# Patient Record
Sex: FEMALE | Race: WHITE | NOT HISPANIC OR LATINO
[De-identification: names, ages, dates, MRNs, and addresses within clinical notes are randomized per-mention and may not be internally consistent; named-entity substitution may affect disease eponyms.]

---

## 2018-05-07 ENCOUNTER — RESULT REVIEW (OUTPATIENT)
Age: 43
End: 2018-05-07

## 2019-07-19 ENCOUNTER — RESULT REVIEW (OUTPATIENT)
Age: 44
End: 2019-07-19

## 2021-03-24 ENCOUNTER — RESULT REVIEW (OUTPATIENT)
Age: 46
End: 2021-03-24

## 2021-11-09 ENCOUNTER — APPOINTMENT (OUTPATIENT)
Dept: PULMONOLOGY | Facility: CLINIC | Age: 46
End: 2021-11-09

## 2021-11-09 PROBLEM — Z00.00 ENCOUNTER FOR PREVENTIVE HEALTH EXAMINATION: Status: ACTIVE | Noted: 2021-11-09

## 2021-11-10 ENCOUNTER — APPOINTMENT (OUTPATIENT)
Dept: PULMONOLOGY | Facility: CLINIC | Age: 46
End: 2021-11-10
Payer: COMMERCIAL

## 2021-11-10 VITALS
TEMPERATURE: 96.8 F | DIASTOLIC BLOOD PRESSURE: 82 MMHG | WEIGHT: 202 LBS | HEIGHT: 63 IN | OXYGEN SATURATION: 98 % | RESPIRATION RATE: 16 BRPM | SYSTOLIC BLOOD PRESSURE: 122 MMHG | HEART RATE: 77 BPM | BODY MASS INDEX: 35.79 KG/M2

## 2021-11-10 DIAGNOSIS — Z78.9 OTHER SPECIFIED HEALTH STATUS: ICD-10-CM

## 2021-11-10 DIAGNOSIS — I10 ESSENTIAL (PRIMARY) HYPERTENSION: ICD-10-CM

## 2021-11-10 DIAGNOSIS — Z86.79 PERSONAL HISTORY OF OTHER DISEASES OF THE CIRCULATORY SYSTEM: ICD-10-CM

## 2021-11-10 DIAGNOSIS — G43.909 MIGRAINE, UNSPECIFIED, NOT INTRACTABLE, W/OUT STATUS MIGRAINOSUS: ICD-10-CM

## 2021-11-10 DIAGNOSIS — G47.19 OTHER HYPERSOMNIA: ICD-10-CM

## 2021-11-10 DIAGNOSIS — Z86.59 PERSONAL HISTORY OF OTHER MENTAL AND BEHAVIORAL DISORDERS: ICD-10-CM

## 2021-11-10 DIAGNOSIS — R06.83 SNORING: ICD-10-CM

## 2021-11-10 DIAGNOSIS — Z82.0 FAMILY HISTORY OF EPILEPSY AND OTHER DISEASES OF THE NERVOUS SYSTEM: ICD-10-CM

## 2021-11-10 PROCEDURE — 99203 OFFICE O/P NEW LOW 30 MIN: CPT

## 2021-11-10 RX ORDER — HYDROCHLOROTHIAZIDE 25 MG/1
25 TABLET ORAL
Qty: 90 | Refills: 0 | Status: DISCONTINUED | COMMUNITY
Start: 2020-11-19

## 2021-11-10 RX ORDER — VALSARTAN 80 MG/1
80 TABLET, COATED ORAL
Qty: 90 | Refills: 0 | Status: COMPLETED | COMMUNITY
Start: 2020-11-19

## 2021-11-10 RX ORDER — RIZATRIPTAN BENZOATE 10 MG/1
10 TABLET ORAL
Qty: 4 | Refills: 0 | Status: COMPLETED | COMMUNITY
Start: 2020-11-19

## 2021-11-10 RX ORDER — VALSARTAN AND HYDROCHLOROTHIAZIDE 160; 25 MG/1; MG/1
160-25 TABLET, FILM COATED ORAL
Refills: 0 | Status: ACTIVE | COMMUNITY

## 2021-11-10 RX ORDER — TRETINOIN 0.25 MG/G
0.03 CREAM TOPICAL
Qty: 20 | Refills: 0 | Status: ACTIVE | COMMUNITY
Start: 2021-11-04

## 2021-11-10 RX ORDER — AZELASTINE HYDROCHLORIDE 137 UG/1
0.1 SPRAY, METERED NASAL
Qty: 30 | Refills: 0 | Status: DISCONTINUED | COMMUNITY
Start: 2020-11-19

## 2021-11-10 RX ORDER — CHOLECALCIFEROL (VITAMIN D3) 25 MCG
TABLET ORAL
Refills: 0 | Status: ACTIVE | COMMUNITY

## 2021-11-10 RX ORDER — ESCITALOPRAM OXALATE 5 MG/1
5 TABLET, FILM COATED ORAL
Refills: 0 | Status: ACTIVE | COMMUNITY

## 2021-11-10 RX ORDER — MULTIVIT-MIN/FOLIC/VIT K/LYCOP 400-300MCG
1000 TABLET ORAL
Refills: 0 | Status: ACTIVE | COMMUNITY

## 2021-11-10 RX ORDER — VALSARTAN AND HYDROCHLOROTHIAZIDE 160; 25 MG/1; MG/1
160-25 TABLET, FILM COATED ORAL
Qty: 90 | Refills: 0 | Status: COMPLETED | COMMUNITY
Start: 2021-06-23

## 2021-11-10 RX ORDER — FLUTICASONE PROPIONATE 50 UG/1
50 SPRAY, METERED NASAL
Qty: 16 | Refills: 0 | Status: ACTIVE | COMMUNITY
Start: 2020-11-19

## 2021-11-10 RX ORDER — AMLODIPINE BESYLATE 5 MG/1
5 TABLET ORAL
Qty: 90 | Refills: 0 | Status: DISCONTINUED | COMMUNITY
Start: 2021-02-24

## 2021-11-10 NOTE — HISTORY OF PRESENT ILLNESS
[FreeTextEntry1] : Dr. Crouch\par 46 year old woman  with history of htn, anxiety is here in the sleep center to address excessive daytime sleepiness and headaches.  Patient is sleepy with Hewitt sleepiness score of 14.  Patient has very loud snoring, also has witnessed apneas.  Patient's bedtime is around 9 PM wakes up in the morning around 6 AM.  She feels tired when she wakes up.  Patient drinks 1-2 cup of coffee during the daytime. Patient also has headaches, no nocturia.  She is not sleepy while driving.\par STOPBANG score - 3\par

## 2021-11-10 NOTE — ASSESSMENT
[FreeTextEntry1] : Patient has symptoms suggestive of sleep apnea. Will order a sleep study. Discussed about details of the study and possible treatment options.\par Patient is anxious around hospitals, will do a home study.

## 2021-11-10 NOTE — REVIEW OF SYSTEMS
normal... [EDS: ESS=____] : daytime somnolence: ESS=[unfilled] [Snoring] : snoring [Witnessed Apneas] : witnessed apnea [Negative] : Psychiatric

## 2021-11-10 NOTE — PHYSICAL EXAM
[General Appearance - Well Developed] : well developed [General Appearance - Well Nourished] : well nourished [Enlarged Base of the Tongue] : enlargement of the base of the tongue [III] : III [FreeTextEntry1] : crowded oropharynx [Heart Sounds] : normal S1 and S2 [Murmurs] : no murmurs [] : no respiratory distress [Auscultation Breath Sounds / Voice Sounds] : lungs were clear to auscultation bilaterally [No Focal Deficits] : no focal deficits [Oriented To Time, Place, And Person] : oriented to person, place, and time [Memory Recent] : recent memory was not impaired

## 2022-01-06 ENCOUNTER — APPOINTMENT (OUTPATIENT)
Dept: BARIATRICS/WEIGHT MGMT | Facility: CLINIC | Age: 47
End: 2022-01-06
Payer: COMMERCIAL

## 2022-01-06 VITALS — WEIGHT: 200 LBS | BODY MASS INDEX: 35.43 KG/M2

## 2022-01-06 PROCEDURE — 97802 MEDICAL NUTRITION INDIV IN: CPT | Mod: 95

## 2022-01-20 ENCOUNTER — APPOINTMENT (OUTPATIENT)
Dept: BARIATRICS/WEIGHT MGMT | Facility: CLINIC | Age: 47
End: 2022-01-20
Payer: COMMERCIAL

## 2022-01-20 DIAGNOSIS — G47.33 OBSTRUCTIVE SLEEP APNEA (ADULT) (PEDIATRIC): ICD-10-CM

## 2022-01-20 DIAGNOSIS — Z83.3 FAMILY HISTORY OF DIABETES MELLITUS: ICD-10-CM

## 2022-01-20 DIAGNOSIS — Z82.49 FAMILY HISTORY OF ISCHEMIC HEART DISEASE AND OTHER DISEASES OF THE CIRCULATORY SYSTEM: ICD-10-CM

## 2022-01-20 PROCEDURE — 99443: CPT | Mod: 95

## 2022-01-20 NOTE — ASSESSMENT
[FreeTextEntry1] : Recommend dietary modification. Reduce intake of bad fat including-fried foods, full fat dairy products, red meat, meat with skin on it, etc. Recommend eating foods with healthy fat including avocado, fish, omega-3 fatty acids, nuts, lean protein. \par Tracking dietary intake daily to increase your awareness of dietary intake. Continue tracking with [facility]\par Physical Activity- recommend aerobic exercise 3-4 times per week for 30-45 mins, recommend incorporating strength training 3 times per week. Exercise goal- walking daily\par Labs- ordered fasting labs, dex stress test and thyroid studies\par SCHUYLER- continue to use mouthguard, should get better with weight loss\par Medication- pt meets criteria to initiate medication treatment of obesity. Instructed pt to reach out to insurance company to see if saxenda, wegovy, contrave covered. AVOID phentermine or qsymia for this patient +contraindication. \par RTO in 2-4 weeks for lab review & next steps. \par \par

## 2022-01-20 NOTE — HISTORY OF PRESENT ILLNESS
[FreeTextEntry1] : 47 y/o Female referred by CIELO Urena for medical weight loss consultation\par Struggled with gaining 70 lbs over the last 2 years since she lost her daughter to cancer, COVID 19-pandemic, new depression, and ACL tear limiting physical activity\par Medical Hx: HTN, depression, Mod SCHUYLER, migraines\par C/o fatigue, joint pain, regular heavy periods, RA workup negative, plans to f/u with GYN regarding periods\par Mental Health- has a therapist for grief management\par Past Weight Loss Attempts: WW, Noom, tried phentermine x 1 month- had a bad experience on phentermine, increased anxiety, hallucinating while on medication\par Needs to see weight loss in order to feel motivated, would like to know options for medication treatment of obesity \par Food Recall: B- skipped, 2 cups of coffee with oatmilk, L- egg white tortilla with chickpeas, D- steak, black beans, cheese and hot sauce bowl. Dessert Frozen yogurt bar\par Tracking dietary intake and sees RD\par Sleep- improved since she started using mouthguard, Noticed increased energy & dec fatigue\par Stress- High r/t pandemic and grief, sees a therapist, taking lexapro 15 mg daily\par Water Intake- adequate\par Labs done in September 2021- available in [facility]\par \par

## 2022-01-24 RX ORDER — DEXAMETHASONE 1 MG/1
1 TABLET ORAL
Qty: 1 | Refills: 0 | Status: ACTIVE | COMMUNITY
Start: 2022-01-24 | End: 1900-01-01

## 2022-01-25 ENCOUNTER — APPOINTMENT (OUTPATIENT)
Dept: BARIATRICS/WEIGHT MGMT | Facility: CLINIC | Age: 47
End: 2022-01-25

## 2022-02-15 ENCOUNTER — APPOINTMENT (OUTPATIENT)
Dept: BARIATRICS/WEIGHT MGMT | Facility: CLINIC | Age: 47
End: 2022-02-15
Payer: COMMERCIAL

## 2022-02-15 PROCEDURE — 99214 OFFICE O/P EST MOD 30 MIN: CPT | Mod: 95

## 2022-02-15 NOTE — ASSESSMENT
[FreeTextEntry1] : Continue dietary modification and tracking. \par Physical Activity- recommend aerobic exercise 3-4 times per week for 30-45 mins, recommend incorporating strength training 3 times per week. Exercise goal- walking daily\par SCHUYLER- continue to use mouthguard, should get better with weight loss & will help with tension HA\par Medication- pt meets criteria to initiate medication treatment of obesity. Can try low dose Wellbutrin with titration down of lexapro. Pt plans to see PCP before starting this medication regimen. Discussed the risk for lowering seizure threshold, denies hx of seizures. Discussed the risk for serotonin syndrome, made aware of symptoms and recommend she d/c wellbutrin if she feels these symptoms. Sending rx for low dose Wellbutrin and pt to see PCP before starting. \par RTO in 1 month- scheduled f.u on 3/15 at 8:30 am \par \par

## 2022-02-15 NOTE — HISTORY OF PRESENT ILLNESS
[Home] : at home, [unfilled] , at the time of the visit. [Other Location: e.g. Home (Enter Location, City,State)___] : at [unfilled] [Verbal consent obtained from patient] : the patient, [unfilled] [FreeTextEntry1] : 45 y/o Female referred by CIELO Urena for medical weight loss consultation\par Struggled with gaining 70 lbs over the last 2 years since she lost her daughter to cancer, COVID 19-pandemic, new depression, and ACL tear limiting physical activity\par Medical Hx: HTN, depression, Mod SCHUYLER, migraines\par C/o fatigue, joint pain, regular heavy periods, RA workup negative, plans to f/u with GYN regarding periods\par Mental Health- has a therapist for grief management\par Past Weight Loss Attempts: MADDY, Alejandro, tried phentermine x 1 month- had a bad experience on phentermine, increased anxiety, hallucinating while on medication\par Needs to see weight loss in order to feel motivated, would like to know options for medication treatment of obesity \par Food Recall: B- skipped, 2 cups of coffee with oatmilk, L- egg white tortilla with chickpeas, D- steak, black beans, cheese and hot sauce bowl. Dessert Frozen yogurt bar\par Tracking dietary intake and sees RD\par Sleep- improved since she started using mouthguard, Noticed increased energy & dec fatigue\par Stress- High r/t pandemic and grief, sees a therapist, taking lexapro 15 mg daily\par Water Intake- adequate\par Labs done in September 2021- available in [facility]\par \par 2/15/22: Reviewed lab results with pt- CBC, CMP, Thyroid studies, Insulin, and Cortisol levels all WNL. Unable to exercise last week, had a tension headache most of last week,starting to feel better now. Focused on eating well, tracking intake with [facility] gre[facility], making Hello Fresh meals with her son. Has good support with friends. Current insurance does not cover obesity management medication. Would like to try Wellbutrin generic to help with cravings.

## 2022-03-15 ENCOUNTER — APPOINTMENT (OUTPATIENT)
Dept: BARIATRICS/WEIGHT MGMT | Facility: CLINIC | Age: 47
End: 2022-03-15
Payer: COMMERCIAL

## 2022-03-15 PROCEDURE — 99213 OFFICE O/P EST LOW 20 MIN: CPT | Mod: 95

## 2022-03-15 NOTE — ASSESSMENT
[FreeTextEntry1] : Continue dietary modification- may benefit from cutting out white flour products and dairy\par Continue tracking intake with [facility] gre[facility]\par Physical Activity- recommend aerobic exercise 3-4 times per week for 30-45 mins, recommend incorporating strength training 3 times per week.\par Continue exercise routine\par SCHUYLER- continue to use mouthguard, should get better with weight loss\par Medication- sent Rx renewal for Wellbutrin 100 mg daily \par RTO in 1 month- scheduled an in-person session to weight her for 4/28 at 8:30am \par \par

## 2022-03-15 NOTE — HISTORY OF PRESENT ILLNESS
[Home] : at home, [unfilled] , at the time of the visit. [Other Location: e.g. Home (Enter Location, City,State)___] : at [unfilled] [Verbal consent obtained from patient] : the patient, [unfilled] [FreeTextEntry1] : 47 y/o Female referred by CIELO Urena for medical weight loss consultation\par Struggled with gaining 70 lbs over the last 2 years since she lost her daughter to cancer, COVID 19-pandemic, new depression, and ACL tear limiting physical activity\par Medical Hx: HTN, depression, Mod SCHUYLER, migraines\par C/o fatigue, joint pain, regular heavy periods, RA workup negative, plans to f/u with GYN regarding periods\par Mental Health- has a therapist for grief management\par Past Weight Loss Attempts: MADDY, Noreema, tried phentermine x 1 month- had a bad experience on phentermine, increased anxiety, hallucinating while on medication\par Needs to see weight loss in order to feel motivated, would like to know options for medication treatment of obesity \par Food Recall: B- skipped, 2 cups of coffee with oatmilk, L- egg white tortilla with chickpeas, D- steak, black beans, cheese and hot sauce bowl. Dessert Frozen yogurt bar\par Tracking dietary intake and sees RD\par Sleep- improved since she started using mouthguard, Noticed increased energy & dec fatigue\par Stress- High r/t pandemic and grief, sees a therapist, taking lexapro 15 mg daily\par Water Intake- adequate\par Labs done in September 2021- available in [facility]\par \par 2/15/22: Reviewed lab results with pt- CBC, CMP, Thyroid studies, Insulin, and Cortisol levels all WNL. Unable to exercise last week, had a tension headache most of last week,starting to feel better now. Focused on eating well, tracking intake with [facility] gre[facility], making Hello Fresh meals with her son. Has good support with friends. Current insurance does not cover obesity management medication. Would like to try Wellbutrin generic to help with cravings. \par \par 3/15/22: Feeling well overall, increased energy, increased motivation, and fits in clothing better. Reports exercising everyday: walking on treadmill, outdoor walks, [employer] strength training videos. Taking Wellbutrin 100 mg daily, denies side effects and feels that her mood is better/calmer. Dietary changes- cut out sugar, continues to make Hello Fresh meals with her son. Refused to weigh herself because she was worried it would be less than she expects and affect her motivation.

## 2022-05-12 ENCOUNTER — APPOINTMENT (OUTPATIENT)
Dept: BARIATRICS/WEIGHT MGMT | Facility: CLINIC | Age: 47
End: 2022-05-12
Payer: SELF-PAY

## 2022-05-12 VITALS — BODY MASS INDEX: 34.55 KG/M2 | HEIGHT: 63 IN | WEIGHT: 195 LBS

## 2022-05-12 DIAGNOSIS — E66.9 OBESITY, UNSPECIFIED: ICD-10-CM

## 2022-05-12 PROCEDURE — 99213 OFFICE O/P EST LOW 20 MIN: CPT | Mod: 95

## 2022-05-12 RX ORDER — BUPROPION HYDROCHLORIDE 100 MG/1
100 TABLET, FILM COATED, EXTENDED RELEASE ORAL DAILY
Qty: 30 | Refills: 0 | Status: ACTIVE | COMMUNITY
Start: 2022-02-15 | End: 1900-01-01

## 2022-05-12 NOTE — HISTORY OF PRESENT ILLNESS
[Home] : at home, [unfilled] , at the time of the visit. [Medical Office: ([facility])___] : at the medical office located in  [Verbal consent obtained from patient] : the patient, [unfilled] [FreeTextEntry1] : 45 y/o Female referred by CIELO Urena for medical weight loss consultation\par Struggled with gaining 70 lbs over the last 2 years since she lost her daughter to cancer, COVID 19-pandemic, new depression, and ACL tear limiting physical activity\par Medical Hx: HTN, depression, Mod SCHUYLER, migraines\par C/o fatigue, joint pain, regular heavy periods, RA workup negative, plans to f/u with GYN regarding periods\par Mental Health- has a therapist for grief management\par Past Weight Loss Attempts: MADDY, Noreema, tried phentermine x 1 month- had a bad experience on phentermine, increased anxiety, hallucinating while on medication\par Needs to see weight loss in order to feel motivated, would like to know options for medication treatment of obesity \par Food Recall: B- skipped, 2 cups of coffee with oatmilk, L- egg white tortilla with chickpeas, D- steak, black beans, cheese and hot sauce bowl. Dessert Frozen yogurt bar\par Tracking dietary intake and sees RD\par Sleep- improved since she started using mouthguard, Noticed increased energy & dec fatigue\par Stress- High r/t pandemic and grief, sees a therapist, taking lexapro 15 mg daily\par Water Intake- adequate\par Labs done in September 2021- available in [facility]\par \par 2/15/22: Reviewed lab results with pt- CBC, CMP, Thyroid studies, Insulin, and Cortisol levels all WNL. Unable to exercise last week, had a tension headache most of last week,starting to feel better now. Focused on eating well, tracking intake with [facility] gre[facility], making Hello Fresh meals with her son. Has good support with friends. Current insurance does not cover obesity management medication. Would like to try Wellbutrin generic to help with cravings. \par \par 3/15/22: Feeling well overall, increased energy, increased motivation, and fits in clothing better. Reports exercising everyday: walking on treadmill, outdoor walks, [employer] strength training videos. Taking Wellbutrin 100 mg daily, denies side effects and feels that her mood is better/calmer. Dietary changes- cut out sugar, continues to make Hello Fresh meals with her son. Refused to weigh herself because she was worried it would be less than she expects and affect her motivation. \par \par 5/12/22: Lost 7 lbs, feels better overall, increased energy & motivation. Walks 20,000 steps daily and plans to start weight training with a  on Mondays. Continues to feel that her mood is better overall on Wellbutrin. Eating healthy meals including hello fresh. Denies cravings sweets. Sleeping better since she is using the mouthguard.

## 2022-06-09 ENCOUNTER — APPOINTMENT (OUTPATIENT)
Dept: BARIATRICS/WEIGHT MGMT | Facility: CLINIC | Age: 47
End: 2022-06-09